# Patient Record
Sex: FEMALE
[De-identification: names, ages, dates, MRNs, and addresses within clinical notes are randomized per-mention and may not be internally consistent; named-entity substitution may affect disease eponyms.]

---

## 2022-10-24 ENCOUNTER — NURSE TRIAGE (OUTPATIENT)
Dept: OTHER | Facility: CLINIC | Age: 67
End: 2022-10-24

## 2022-10-24 NOTE — TELEPHONE ENCOUNTER
Received call from Carmen King at [facility] with Red Flag Complaint. Appt to establish care at [facility] on November 4th. Subjective: Caller states \"BP elevated\"     Current Symptoms: Yesterday 180/111, Today 160/118   Automatic home bp cuff, upper arm. Taking 3 BP medications as rx. Denies current symptoms    Onset: At least since yesterday, had not checked until yesterday when pt reported a headache    Associated Symptoms:  Eating and drinking normally, ADLs normal    Pain Severity: 0/10    Temperature: Denies    What has been tried: BP meds as rx    LMP: NA Pregnant: NA    Recommended disposition: See in Office Today    Care advice provided, patient verbalizes understanding; denies any other questions or concerns; instructed to call back for any new or worsening symptoms. Patient/caller agrees to proceed to nearest [facility] . Will keep appt to establish care on 11/4. Attention Provider: Thank you for allowing me to participate in the care of your patient. The patient was connected to triage in response to information provided to the [facility]. Please do not respond through this encounter as the response is not directed to a shared pool.       Reason for Disposition   Systolic BP >= 975 OR Diastolic >= 420    Protocols used: Blood Pressure - High-ADULT-OH